# Patient Record
Sex: FEMALE | URBAN - METROPOLITAN AREA
[De-identification: names, ages, dates, MRNs, and addresses within clinical notes are randomized per-mention and may not be internally consistent; named-entity substitution may affect disease eponyms.]

---

## 2024-04-15 ENCOUNTER — APPOINTMENT (RX ONLY)
Dept: URBAN - METROPOLITAN AREA CLINIC 50 | Facility: CLINIC | Age: 29
Setting detail: DERMATOLOGY
End: 2024-04-15

## 2024-04-15 DIAGNOSIS — L50.1 IDIOPATHIC URTICARIA: ICD-10-CM | Status: INADEQUATELY CONTROLLED

## 2024-04-15 DIAGNOSIS — L71.0 PERIORAL DERMATITIS: ICD-10-CM

## 2024-04-15 PROCEDURE — ? COUNSELING

## 2024-04-15 PROCEDURE — ? FULL BODY SKIN EXAM - DECLINED

## 2024-04-15 PROCEDURE — ? PRESCRIPTION MEDICATION MANAGEMENT

## 2024-04-15 PROCEDURE — ? PATIENT SPECIFIC COUNSELING

## 2024-04-15 PROCEDURE — 99204 OFFICE O/P NEW MOD 45 MIN: CPT

## 2024-04-15 PROCEDURE — ? ADDITIONAL NOTES

## 2024-04-15 ASSESSMENT — LOCATION SIMPLE DESCRIPTION DERM
LOCATION SIMPLE: RIGHT CHEEK
LOCATION SIMPLE: RIGHT FOREHEAD
LOCATION SIMPLE: RIGHT AXILLARY VAULT
LOCATION SIMPLE: LEFT AXILLARY VAULT
LOCATION SIMPLE: RIGHT ANTERIOR NECK
LOCATION SIMPLE: UPPER BACK
LOCATION SIMPLE: LEFT CHEEK

## 2024-04-15 ASSESSMENT — LOCATION ZONE DERM
LOCATION ZONE: NECK
LOCATION ZONE: AXILLAE
LOCATION ZONE: FACE
LOCATION ZONE: TRUNK

## 2024-04-15 ASSESSMENT — LOCATION DETAILED DESCRIPTION DERM
LOCATION DETAILED: RIGHT SUPERIOR MEDIAL BUCCAL CHEEK
LOCATION DETAILED: RIGHT INFERIOR ANTERIOR NECK
LOCATION DETAILED: LEFT SUPERIOR CENTRAL BUCCAL CHEEK
LOCATION DETAILED: LEFT AXILLARY VAULT
LOCATION DETAILED: RIGHT AXILLARY VAULT
LOCATION DETAILED: RIGHT INFERIOR MEDIAL FOREHEAD
LOCATION DETAILED: SUPERIOR THORACIC SPINE

## 2024-04-15 NOTE — PROCEDURE: PRESCRIPTION MEDICATION MANAGEMENT
Plan: Explained to patient that antihistamines are key for management and she will need to stay on them long term until flares stop. She is going to see an allergist and we discussed realistic expectations regarding finding an answer as to what is triggering her flares
Render In Strict Bullet Format?: No
Initiate Treatment: Xyzal QHS and Claritin QAM (Claritin up to 3x per day as needed)
Discontinue Regimen: Cautioned about taking systemic steroids and the rebound effect. Patient is now afraid to use even topical steroids. We discussed their roll in management of flares
Detail Level: Zone
Continue Regimen: Famotidine

## 2024-04-15 NOTE — PROCEDURE: ADDITIONAL NOTES
Detail Level: Simple
Render Risk Assessment In Note?: no
Additional Notes: Patient declined steroid cream.